# Patient Record
Sex: MALE | URBAN - METROPOLITAN AREA
[De-identification: names, ages, dates, MRNs, and addresses within clinical notes are randomized per-mention and may not be internally consistent; named-entity substitution may affect disease eponyms.]

---

## 2020-06-25 ENCOUNTER — TELEPHONE (OUTPATIENT)
Dept: FAMILY MEDICINE CLINIC | Age: 65
End: 2020-06-25

## 2020-06-26 ENCOUNTER — OFFICE VISIT (OUTPATIENT)
Dept: FAMILY MEDICINE CLINIC | Age: 65
End: 2020-06-26
Payer: COMMERCIAL

## 2020-06-26 VITALS
OXYGEN SATURATION: 98 % | DIASTOLIC BLOOD PRESSURE: 100 MMHG | RESPIRATION RATE: 12 BRPM | SYSTOLIC BLOOD PRESSURE: 152 MMHG | BODY MASS INDEX: 32.44 KG/M2 | WEIGHT: 190 LBS | TEMPERATURE: 98.3 F | HEART RATE: 78 BPM | HEIGHT: 64 IN

## 2020-06-26 PROCEDURE — 99203 OFFICE O/P NEW LOW 30 MIN: CPT | Performed by: FAMILY MEDICINE

## 2020-06-26 RX ORDER — ERGOCALCIFEROL 1.25 MG/1
1 CAPSULE ORAL WEEKLY
COMMUNITY

## 2020-06-26 RX ORDER — CLOTRIMAZOLE AND BETAMETHASONE DIPROPIONATE 10; .64 MG/G; MG/G
CREAM TOPICAL 2 TIMES DAILY
COMMUNITY
Start: 2020-04-06 | End: 2020-06-26 | Stop reason: SDUPTHER

## 2020-06-26 RX ORDER — CLOTRIMAZOLE AND BETAMETHASONE DIPROPIONATE 10; .64 MG/G; MG/G
CREAM TOPICAL 2 TIMES DAILY
Qty: 45 G | Refills: 1 | Status: SHIPPED | OUTPATIENT
Start: 2020-06-26 | End: 2020-10-30 | Stop reason: SDUPTHER

## 2020-06-26 RX ORDER — DOXYCYCLINE HYCLATE 100 MG
100 TABLET ORAL 2 TIMES DAILY
Qty: 20 TABLET | Refills: 1 | Status: SHIPPED | OUTPATIENT
Start: 2020-06-26 | End: 2020-10-30 | Stop reason: SDUPTHER

## 2020-06-26 ASSESSMENT — PATIENT HEALTH QUESTIONNAIRE - PHQ9
SUM OF ALL RESPONSES TO PHQ9 QUESTIONS 1 & 2: 0
SUM OF ALL RESPONSES TO PHQ QUESTIONS 1-9: 0
SUM OF ALL RESPONSES TO PHQ QUESTIONS 1-9: 0
1. LITTLE INTEREST OR PLEASURE IN DOING THINGS: 0
2. FEELING DOWN, DEPRESSED OR HOPELESS: 0

## 2020-06-26 NOTE — PROGRESS NOTES
abused: None     Forced sexual activity: None   Other Topics Concern    None   Social History Narrative    None       Social History     Substance and Sexual Activity   Drug Use Not on file       History reviewed. No pertinent family history. Vitals:    06/26/20 1006   BP: (!) 152/100   Site: Left Upper Arm   Position: Sitting   Cuff Size: Large Adult   Pulse: 78   Resp: 12   Temp: 98.3 °F (36.8 °C)   TempSrc: Temporal   SpO2: 98%   Weight: 190 lb (86.2 kg)   Height: 5' 4\" (1.626 m)        Review of Systems   Constitutional: Negative. Respiratory: Negative. Cardiovascular: Negative. Objective:   Physical Exam  Constitutional:       General: He is not in acute distress. Appearance: He is well-developed. He is not diaphoretic. HENT:      Head: Normocephalic and atraumatic. Eyes:      General: No scleral icterus. Neck:      Musculoskeletal: Normal range of motion and neck supple. Thyroid: No thyromegaly. Trachea: No tracheal deviation. Cardiovascular:      Rate and Rhythm: Normal rate and regular rhythm. Heart sounds: Normal heart sounds. No murmur. Pulmonary:      Effort: Pulmonary effort is normal.      Breath sounds: Normal breath sounds. Lymphadenopathy:      Head:      Right side of head: No submental or submandibular adenopathy. Left side of head: No submental or submandibular adenopathy. Cervical: No cervical adenopathy. Skin:     General: Skin is warm and dry. Comments: Slightly hyperpigmented papules in axilla   Neurological:      Mental Status: He is alert and oriented to person, place, and time. Cranial Nerves: No cranial nerve deficit. Psychiatric:         Behavior: Behavior normal.         Thought Content: Thought content normal.         Judgment: Judgment normal.         Assessment:       Diagnosis Orders   1.  Folliculitis            Plan:      Sounds like has had some tinea off an on but looks like right now predominantly folliculitis.   Covering with doxy  Medication side affects and adverse reactions reviewed, including photosensitivity      blood pressure improved on recheck  Robin Mas MD

## 2020-06-26 NOTE — PATIENT INSTRUCTIONS
A serving is 1 slice of bread, 1 ounce of dry cereal, or ½ cup of cooked rice, pasta, or cooked cereal. Try to choose whole-grain products as much as possible. · Limit lean meat, poultry, and fish to 2 servings each day. A serving is 3 ounces, about the size of a deck of cards. · Eat 4 to 5 servings of nuts, seeds, and legumes (cooked dried beans, lentils, and split peas) each week. A serving is 1/3 cup of nuts, 2 tablespoons of seeds, or ½ cup of cooked beans or peas. · Limit fats and oils to 2 to 3 servings each day. A serving is 1 teaspoon of vegetable oil or 2 tablespoons of salad dressing. · Limit sweets and added sugars to 5 servings or less a week. A serving is 1 tablespoon jelly or jam, ½ cup sorbet, or 1 cup of lemonade. · Eat less than 2,300 milligrams (mg) of sodium a day. If you limit your sodium to 1,500 mg a day, you can lower your blood pressure even more. Tips for success  · Start small. Do not try to make dramatic changes to your diet all at once. You might feel that you are missing out on your favorite foods and then be more likely to not follow the plan. Make small changes, and stick with them. Once those changes become habit, add a few more changes. · Try some of the following:  ? Make it a goal to eat a fruit or vegetable at every meal and at snacks. This will make it easy to get the recommended amount of fruits and vegetables each day. ? Try yogurt topped with fruit and nuts for a snack or healthy dessert. ? Add lettuce, tomato, cucumber, and onion to sandwiches. ? Combine a ready-made pizza crust with low-fat mozzarella cheese and lots of vegetable toppings. Try using tomatoes, squash, spinach, broccoli, carrots, cauliflower, and onions. ? Have a variety of cut-up vegetables with a low-fat dip as an appetizer instead of chips and dip. ? Sprinkle sunflower seeds or chopped almonds over salads. Or try adding chopped walnuts or almonds to cooked vegetables.   ? Try some vegetarian meals using beans and peas. Add garbanzo or kidney beans to salads. Make burritos and tacos with mashed chaudhari beans or black beans. Where can you learn more? Go to https://chcodyeb.Comfy. org and sign in to your [employer] account. Enter I849 in the eVoter box to learn more about \"DASH Diet: Care Instructions. \"     If you do not have an account, please click on the \"Sign Up Now\" link. Current as of: December 16, 2019               Content Version: 12.5  © 7679-1726 Healthwise, Incorporated. Care instructions adapted under license by [facility] ([facility]). If you have questions about a medical condition or this instruction, always ask your healthcare professional. [address] any warranty or liability for your use of this information.

## 2020-06-28 ASSESSMENT — ENCOUNTER SYMPTOMS: RESPIRATORY NEGATIVE: 1

## 2020-07-23 ENCOUNTER — TELEPHONE (OUTPATIENT)
Dept: FAMILY MEDICINE CLINIC | Age: 65
End: 2020-07-23

## 2020-10-30 RX ORDER — DOXYCYCLINE HYCLATE 100 MG
100 TABLET ORAL 2 TIMES DAILY
Qty: 20 TABLET | Refills: 1 | Status: SHIPPED | OUTPATIENT
Start: 2020-10-30 | End: 2020-12-04

## 2020-10-30 RX ORDER — CLOTRIMAZOLE AND BETAMETHASONE DIPROPIONATE 10; .64 MG/G; MG/G
CREAM TOPICAL 2 TIMES DAILY
Qty: 45 G | Refills: 1 | Status: SHIPPED | OUTPATIENT
Start: 2020-10-30

## 2020-12-04 ENCOUNTER — OFFICE VISIT (OUTPATIENT)
Dept: FAMILY MEDICINE CLINIC | Age: 65
End: 2020-12-04
Payer: COMMERCIAL

## 2020-12-04 VITALS
WEIGHT: 191 LBS | SYSTOLIC BLOOD PRESSURE: 123 MMHG | BODY MASS INDEX: 32.79 KG/M2 | DIASTOLIC BLOOD PRESSURE: 80 MMHG | HEART RATE: 95 BPM | OXYGEN SATURATION: 98 % | TEMPERATURE: 96.7 F

## 2020-12-04 PROCEDURE — 99213 OFFICE O/P EST LOW 20 MIN: CPT | Performed by: FAMILY MEDICINE

## 2020-12-04 RX ORDER — CLINDAMYCIN PHOSPHATE 11.9 MG/ML
SOLUTION TOPICAL
Qty: 30 ML | Refills: 2 | Status: SHIPPED | OUTPATIENT
Start: 2020-12-04 | End: 2021-01-03

## 2020-12-04 RX ORDER — MINOCYCLINE HYDROCHLORIDE 100 MG/1
100 CAPSULE ORAL 2 TIMES DAILY
Qty: 20 CAPSULE | Refills: 0 | Status: SHIPPED | OUTPATIENT
Start: 2020-12-04 | End: 2021-02-22 | Stop reason: ALTCHOICE

## 2021-02-22 ENCOUNTER — OFFICE VISIT (OUTPATIENT)
Dept: FAMILY MEDICINE CLINIC | Age: 66
End: 2021-02-22
Payer: COMMERCIAL

## 2021-02-22 ENCOUNTER — TELEPHONE (OUTPATIENT)
Dept: FAMILY MEDICINE CLINIC | Age: 66
End: 2021-02-22

## 2021-02-22 VITALS
BODY MASS INDEX: 32.95 KG/M2 | OXYGEN SATURATION: 98 % | SYSTOLIC BLOOD PRESSURE: 136 MMHG | HEIGHT: 64 IN | WEIGHT: 193 LBS | HEART RATE: 82 BPM | TEMPERATURE: 96.9 F | DIASTOLIC BLOOD PRESSURE: 80 MMHG

## 2021-02-22 DIAGNOSIS — L08.1 ERYTHRASMA: ICD-10-CM

## 2021-02-22 DIAGNOSIS — L24.9 IRRITANT CONTACT DERMATITIS, UNSPECIFIED TRIGGER: Primary | ICD-10-CM

## 2021-02-22 PROCEDURE — 99213 OFFICE O/P EST LOW 20 MIN: CPT | Performed by: FAMILY MEDICINE

## 2021-02-22 RX ORDER — TRIAMCINOLONE ACETONIDE 5 MG/G
CREAM TOPICAL
Qty: 15 G | Refills: 1 | Status: SHIPPED | OUTPATIENT
Start: 2021-02-22 | End: 2022-01-17 | Stop reason: SDUPTHER

## 2021-02-22 RX ORDER — ERYTHROMYCIN 20 MG/G
GEL TOPICAL
Qty: 30 G | Refills: 1 | Status: SHIPPED | OUTPATIENT
Start: 2021-02-22 | End: 2022-09-23 | Stop reason: SDUPTHER

## 2021-02-22 SDOH — ECONOMIC STABILITY: TRANSPORTATION INSECURITY
IN THE PAST 12 MONTHS, HAS THE LACK OF TRANSPORTATION KEPT YOU FROM MEDICAL APPOINTMENTS OR FROM GETTING MEDICATIONS?: NOT ASKED

## 2021-02-22 ASSESSMENT — ENCOUNTER SYMPTOMS
DIARRHEA: 0
CONSTIPATION: 0
SORE THROAT: 0
ABDOMINAL PAIN: 0
RHINORRHEA: 0
CHEST TIGHTNESS: 0
SHORTNESS OF BREATH: 0

## 2021-02-22 NOTE — TELEPHONE ENCOUNTER
----- Message from Rommel Rhiannon sent at 2/22/2021  9:32 AM EST -----  Subject: Appointment Request    Reason for Call: Urgent Skin Problem    QUESTIONS  Type of Appointment? Established Patient  Reason for appointment request? Other - Sorry [facility] can't book appt type. Additional Information for Provider? Pt has a rash on stomach. It started as 1 red dot then spread to more. No pain and he can't tell if it has gotten worse. He is open to vv and says mornings work best if possible.   ---------------------------------------------------------------------------  --------------  [address]  What is the best way for the office to contact you? OK to leave message on   voicemail  Preferred Call Back Phone Number? [phone]  ---------------------------------------------------------------------------  --------------  SCRIPT ANSWERS  Relationship to Patient? Self  Appointment reason? Symptomatic  Select script based on patient symptoms? Adult Skin Problems [Rash   Hives   Blisters   Lumps   Bumps   Sores   Bite]   Are you having swelling in your throat or face? No  Are you having difficulty breathing? No  Have the symptoms worsened or spread in the last day? No  Are you having pain with your symptoms? No  Are you having fevers (100.4)   chills or sweats? No  Have you previously seen a provider for this? No  Have you been diagnosed with   tested for   or told that you are suspected of having COVID-19 (Coronavirus)? No  Have you had a fever or taken medication to treat a fever within the past   3 days? No  Have you had a cough   shortness of breath or flu-like symptoms within the past 3 days? No  Do you currently have flu-like symptoms including fever or chills   cough   shortness of breath   or difficulty breathing   or new loss of taste or smell? No  (Service Expert - click yes below to proceed with [employer] As Usual   Scheduling)?  Yes

## 2021-02-22 NOTE — PROGRESS NOTES
Subjective:   Patient ID: Buster Morales is a 63 y.o. male. HPI by clinical support staff:   Chief Complaint   Patient presents with    Rash     right ankle and right lower abdomen, itching, no pain, x 1 week      Preliminary data above this line collected by clinical support staff.    ______________________________________________________________________  HPI by Provider:   HPI   Patient presents for urgent visit. Reports rash x 1 week. itches but not painful. Tried clotrimazole/bethamethasone with no relief. Denies chills, fever, sick contacts, arthralgia, recent upper respiratory infection and change sin skin care products or detergents. Reports different rash in armpits for months- tried clotrimazole/bethamethasone with no relief. Trying to keep area dry. Data above this line collected by Provider. Patient's medications, allergies, past medical, surgical, social and family histories were reviewed and updated as appropriate. Patient Care Team:  Tico Plata MD as PCP - General (Family Medicine)  Tico Plata MD as PCP - [facility] Empaneled Provider    Current Outpatient Medications on File Prior to Visit   Medication Sig Dispense Refill    clotrimazole-betamethasone (LOTRISONE) 1-0.05 % cream Apply topically 2 times daily 45 g 1    vitamin D (ERGOCALCIFEROL) 1.25 MG (40054 UT) CAPS capsule Take 1 tablet by mouth once a week       No current facility-administered medications on file prior to visit. Review of Systems   Constitutional: Negative for activity change, appetite change, fatigue and fever. HENT: Negative for congestion, rhinorrhea and sore throat. Respiratory: Negative for chest tightness and shortness of breath. Cardiovascular: Negative for chest pain, palpitations and leg swelling. Gastrointestinal: Negative for abdominal pain, constipation and diarrhea. Genitourinary: Negative for dysuria and frequency. Musculoskeletal: Negative for arthralgias.    Skin: Positive for rash. Neurological: Negative for dizziness, weakness and headaches. Psychiatric/Behavioral: Negative for hallucinations. All other systems reviewed and are negative. ROS above this line reviewed by Provider. Objective:   /80   Pulse 82   Temp 96.9 °F (36.1 °C)   Ht 5' 4\" (1.626 m)   Wt 193 lb (87.5 kg)   SpO2 98%   BMI 33.13 kg/m²   Physical Exam  Vitals signs and nursing note reviewed. Constitutional:       General: He is not in acute distress. Appearance: Normal appearance. He is normal weight. He is not ill-appearing, toxic-appearing or diaphoretic. HENT:      Head: Normocephalic and atraumatic. Eyes:      General: No scleral icterus. Conjunctiva/sclera: Conjunctivae normal.   Neck:      Musculoskeletal: Normal range of motion. Cardiovascular:      Rate and Rhythm: Normal rate and regular rhythm. Heart sounds: Normal heart sounds. No murmur. No friction rub. No gallop. Pulmonary:      Effort: Pulmonary effort is normal.   Abdominal:       Skin:     General: Skin is warm and dry. Findings: Rash (macular rash with dry scaly skin. no vessicles. non tender.) present. Neurological:      Mental Status: He is alert. Psychiatric:         Mood and Affect: Mood normal.         Behavior: Behavior normal.       Assessment and Plan:   1. Irritant contact dermatitis, unspecified trigger  Avoid triggers- does not appear to be shingles. - triamcinolone (ARISTOCORT) 0.5 % cream; Apply topically 2 times daily to abdomen. Dispense: 15 g; Refill: 1    2. Erythrasma  Tried and failed clotrimazole/bethamethasone with no relief. - erythromycin with ethanol (EMGEL) 2 % gel; Apply topically 2 times daily. Dispense: 30 g; Refill: 1           This chart note was prepared using a voice recognition dictation program. This note was reviewed for accuracy; however, addition, deletion and sound-alike word errors may occur.  If there are any questions regarding this chart note, please contact the originating provider. Electronically signed by   Aundrea Perrin MD  2/22/2021   2:05 PM    Return if symptoms worsen or fail to improve.

## 2021-02-22 NOTE — TELEPHONE ENCOUNTER
----- Message from Randee Agostois sent at 2/22/2021  9:14 AM EST -----  Subject: Appointment Request    Reason for Call: Urgent Skin Problem    QUESTIONS  Type of Appointment? Established Patient  Reason for appointment request? Other - [facility] cannot schedule  Additional Information for Provider? Patient has a rash on his stomach and wants to rule out shingles. Patient needs a call back to schedule appointment.  ---------------------------------------------------------------------------  --------------  [facility]  What is the best way for the office to contact you? OK to leave message on   voicemail  Preferred Call Back Phone Number? [phone]  ---------------------------------------------------------------------------  --------------  SCRIPT ANSWERS  Relationship to Patient? Self  Appointment reason? Symptomatic  Select script based on patient symptoms? Adult Skin Problems [Rash   Hives   Blisters   Lumps   Bumps   Sores   Bite]   Are you having swelling in your throat or face? No  Are you having difficulty breathing? No  Have the symptoms worsened or spread in the last day? No  Are you having pain with your symptoms? No  Are you having fevers (100.4)   chills or sweats? No  Have you previously seen a provider for this? No  Have you been diagnosed with   tested for   or told that you are suspected of having COVID-19 (Coronavirus)? No  Have you had a fever or taken medication to treat a fever within the past   3 days? No  Have you had a cough   shortness of breath or flu-like symptoms within the past 3 days? No  Do you currently have flu-like symptoms including fever or chills   cough   shortness of breath   or difficulty breathing   or new loss of taste or smell? No  (Service Expert - click yes below to proceed with [employer] As Usual   Scheduling)?  Yes

## 2022-01-17 ENCOUNTER — PATIENT MESSAGE (OUTPATIENT)
Dept: PRIMARY CARE CLINIC | Age: 67
End: 2022-01-17

## 2022-01-17 ENCOUNTER — PATIENT MESSAGE (OUTPATIENT)
Dept: FAMILY MEDICINE CLINIC | Age: 67
End: 2022-01-17

## 2022-01-17 DIAGNOSIS — L24.9 IRRITANT CONTACT DERMATITIS, UNSPECIFIED TRIGGER: ICD-10-CM

## 2022-01-17 RX ORDER — TRIAMCINOLONE ACETONIDE 5 MG/G
CREAM TOPICAL
Qty: 15 G | Refills: 1 | Status: CANCELLED | OUTPATIENT
Start: 2022-01-17 | End: 2022-01-24

## 2022-01-17 RX ORDER — TRIAMCINOLONE ACETONIDE 5 MG/G
CREAM TOPICAL
Qty: 15 G | Refills: 1 | Status: SHIPPED | OUTPATIENT
Start: 2022-01-17 | End: 2022-01-24

## 2022-01-17 NOTE — TELEPHONE ENCOUNTER
Medication:   Requested Prescriptions     Pending Prescriptions Disp Refills    triamcinolone (ARISTOCORT) 0.5 % cream 15 g 1     Sig: Apply topically 2 times daily to abdomen. Last Filled:  02/22/2021    Patient Phone Number: [phone] (home)     Last appt: 2/22/2021   Next appt: Visit date not found    Last OARRS: No flowsheet data found.

## 2022-01-17 NOTE — TELEPHONE ENCOUNTER
From: Tim Toney  To: Dr. Clemencia Wang  Sent: 1/17/2022 11:43 AM EST  Subject: Refill    Need refill for TRIAMCINOLONE ACETONIDE CREAM

## 2022-01-17 NOTE — TELEPHONE ENCOUNTER
From: Khadar Orona  To: Dr. Jo Ann Mcfadden: 1/17/2022 11:42 AM EST  Subject: Refill    Need refill for TRIAMCLINOLONE Cream

## 2022-08-22 ENCOUNTER — TELEPHONE (OUTPATIENT)
Dept: FAMILY MEDICINE CLINIC | Age: 67
End: 2022-08-22

## 2022-08-22 NOTE — TELEPHONE ENCOUNTER
Patient has not seen Dr. Klever Claudio since 2020, advised him he needs to schedule an appointment - he declines at this time, but will call back.

## 2022-08-22 NOTE — TELEPHONE ENCOUNTER
Erythromycin with ethanol (EMGEL) 2% gel. Pt uses this for itching under his armpits. Rx previously written by Dr. Luis Alfonso. Send to [facility] [employer] [address].

## 2022-08-30 DIAGNOSIS — L24.9 IRRITANT CONTACT DERMATITIS, UNSPECIFIED TRIGGER: ICD-10-CM

## 2022-08-31 RX ORDER — TRIAMCINOLONE ACETONIDE 5 MG/G
CREAM TOPICAL
Qty: 15 G | Refills: 1 | Status: SHIPPED | OUTPATIENT
Start: 2022-08-31

## 2022-09-23 DIAGNOSIS — L08.1 ERYTHRASMA: ICD-10-CM

## 2022-09-23 RX ORDER — ERYTHROMYCIN 20 MG/G
GEL TOPICAL
Qty: 30 G | Refills: 1 | Status: SHIPPED | OUTPATIENT
Start: 2022-09-23

## 2022-11-11 DIAGNOSIS — L24.9 IRRITANT CONTACT DERMATITIS, UNSPECIFIED TRIGGER: ICD-10-CM

## 2022-11-11 RX ORDER — TRIAMCINOLONE ACETONIDE 5 MG/G
CREAM TOPICAL
Qty: 15 G | Refills: 1 | Status: SHIPPED | OUTPATIENT
Start: 2022-11-11

## 2022-11-11 NOTE — TELEPHONE ENCOUNTER
Medication:   Requested Prescriptions     Pending Prescriptions Disp Refills    triamcinolone (ARISTOCORT) 0.5 % cream 15 g 1     Sig: APPLY TOPICALLY TWICE A DAY TO ABDOMEN        Last Filled: 8/31/2022   Last appt: 2/22/2021   Next appt: NONE

## 2022-12-30 ENCOUNTER — OFFICE VISIT (OUTPATIENT)
Dept: PRIMARY CARE CLINIC | Age: 67
End: 2022-12-30
Payer: COMMERCIAL

## 2022-12-30 VITALS
BODY MASS INDEX: 32.61 KG/M2 | HEART RATE: 80 BPM | SYSTOLIC BLOOD PRESSURE: 126 MMHG | RESPIRATION RATE: 16 BRPM | HEIGHT: 64 IN | OXYGEN SATURATION: 98 % | DIASTOLIC BLOOD PRESSURE: 80 MMHG | WEIGHT: 191 LBS

## 2022-12-30 DIAGNOSIS — B37.89 CANDIDA RASH OF GROIN: Primary | ICD-10-CM

## 2022-12-30 PROCEDURE — 1123F ACP DISCUSS/DSCN MKR DOCD: CPT | Performed by: FAMILY MEDICINE

## 2022-12-30 PROCEDURE — 99213 OFFICE O/P EST LOW 20 MIN: CPT | Performed by: FAMILY MEDICINE

## 2022-12-30 RX ORDER — CLOTRIMAZOLE AND BETAMETHASONE DIPROPIONATE 10; .64 MG/G; MG/G
CREAM TOPICAL 2 TIMES DAILY
Qty: 45 G | Refills: 1 | Status: SHIPPED | OUTPATIENT
Start: 2022-12-30

## 2022-12-30 SDOH — ECONOMIC STABILITY: FOOD INSECURITY: WITHIN THE PAST 12 MONTHS, YOU WORRIED THAT YOUR FOOD WOULD RUN OUT BEFORE YOU GOT MONEY TO BUY MORE.: NEVER TRUE

## 2022-12-30 SDOH — ECONOMIC STABILITY: FOOD INSECURITY: WITHIN THE PAST 12 MONTHS, THE FOOD YOU BOUGHT JUST DIDN'T LAST AND YOU DIDN'T HAVE MONEY TO GET MORE.: NEVER TRUE

## 2022-12-30 ASSESSMENT — PATIENT HEALTH QUESTIONNAIRE - PHQ9
SUM OF ALL RESPONSES TO PHQ9 QUESTIONS 1 & 2: 0
SUM OF ALL RESPONSES TO PHQ QUESTIONS 1-9: 0
8. MOVING OR SPEAKING SO SLOWLY THAT OTHER PEOPLE COULD HAVE NOTICED. OR THE OPPOSITE, BEING SO FIGETY OR RESTLESS THAT YOU HAVE BEEN MOVING AROUND A LOT MORE THAN USUAL: 0
SUM OF ALL RESPONSES TO PHQ QUESTIONS 1-9: 0
2. FEELING DOWN, DEPRESSED OR HOPELESS: 0
6. FEELING BAD ABOUT YOURSELF - OR THAT YOU ARE A FAILURE OR HAVE LET YOURSELF OR YOUR FAMILY DOWN: 0
10. IF YOU CHECKED OFF ANY PROBLEMS, HOW DIFFICULT HAVE THESE PROBLEMS MADE IT FOR YOU TO DO YOUR WORK, TAKE CARE OF THINGS AT HOME, OR GET ALONG WITH OTHER PEOPLE: 0
9. THOUGHTS THAT YOU WOULD BE BETTER OFF DEAD, OR OF HURTING YOURSELF: 0
4. FEELING TIRED OR HAVING LITTLE ENERGY: 0
1. LITTLE INTEREST OR PLEASURE IN DOING THINGS: 0
7. TROUBLE CONCENTRATING ON THINGS, SUCH AS READING THE NEWSPAPER OR WATCHING TELEVISION: 0
3. TROUBLE FALLING OR STAYING ASLEEP: 0
SUM OF ALL RESPONSES TO PHQ QUESTIONS 1-9: 0
5. POOR APPETITE OR OVEREATING: 0
SUM OF ALL RESPONSES TO PHQ QUESTIONS 1-9: 0

## 2022-12-30 ASSESSMENT — ENCOUNTER SYMPTOMS
APNEA: 0
ALLERGIC/IMMUNOLOGIC NEGATIVE: 1
EYES NEGATIVE: 1
GASTROINTESTINAL NEGATIVE: 1
RESPIRATORY NEGATIVE: 1

## 2022-12-30 ASSESSMENT — SOCIAL DETERMINANTS OF HEALTH (SDOH): HOW HARD IS IT FOR YOU TO PAY FOR THE VERY BASICS LIKE FOOD, HOUSING, MEDICAL CARE, AND HEATING?: NOT HARD AT ALL

## 2022-12-30 NOTE — PROGRESS NOTES
SUBJECTIVE:  Patient ID: Pedro Jesus is a 74 y.o. male. Chief Complaint:  Chief Complaint   Patient presents with    Establish Care    Rash     Right thigh, will go away a little bit but come back. HPI  74year old  Rash x 1 month   Itch when get dry   Rash groin area   Use Rx prescribed by Dr Anthony Nice work Remote sit all day   Pt denies any chronic disease or any medical problem  I did correct his problem list     Past Medical History:   Diagnosis Date    BMI 32.0-32.9,adult      Past Surgical History:   Procedure Laterality Date    ARM SURGERY Left     Duepaulina Mcclurem while playing [employer]  year [birth year]4     Allergies   Allergen Reactions    Other Swelling     Ppd-hair dye causes rash and swelling all over body     Penicillins Rash       Family History   Problem Relation Age of Onset    Other Mother         Dengue fever    Kidney Disease Mother         +Dialysis    Other Father         Dengue    Other Brother         Pneumonia     Social History     Social History Narrative    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 30 g 1     No current facility-administered medications for this visit.      No results found for: WBC, HGB, HCT, MCV, PLT  No results found for: CHOL  No results found for: TRIG  No results found for: HDL  No results found for: LDLCHOLESTEROL, LDLCALC  No results found for: LABVLDL, VLDL  No results found for: CHOLHDLRATIO    Chemistry    No results found for: NA, K, CL, CO2, BUN, CREATININE, GLU No results found for: CALCIUM, ALKPHOS, AST, ALT, BILITOT         Review of Systems   Constitutional: Negative. HENT: Negative. Eyes: Negative. Respiratory: Negative. Negative for apnea. Cardiovascular: Negative. Gastrointestinal: Negative. Endocrine: Negative. Genitourinary: Negative. Musculoskeletal: Negative. Skin:         Groin itch x over a month   Allergic/Immunologic: Negative. Neurological: Negative. Hematological: Negative. Psychiatric/Behavioral: Negative. OBJECTIVE:  /80   Pulse 80   Resp 16   Ht 5' 4\" (1.626 m)   Wt 191 lb (86.6 kg)   SpO2 98%   BMI 32.79 kg/m²   Physical Exam  Constitutional:       Comments: BMI 32   HENT:      Head: Normocephalic and atraumatic. Eyes:      Extraocular Movements: Extraocular movements intact. Pupils: Pupils are equal, round, and reactive to light. Skin:     Comments: Groin + erythema + scaly area,bilateral   Neurological:      Mental Status: He is alert. ASSESSMENT/PLAN:      Diagnosis Orders   1. Candida rash of groin  clotrimazole-betamethasone (LOTRISONE) 1-0.05 % cream      2.  BMI 32.0-32.9,adult              Pt denies any medical problem or chronic disease  I did correct his History & medical problem list  PMH,PSH,Allergy,FH,SocialHx documentation done  Pt get lab through [address] through wife work last lab 8/2022  Get copy of  recent lablab   No record for lab or colonoscopy  Last lab @ date 6/29/2006  Previous [address]  visit 4/4/2020  [facility] office visit 2/22/2021  & 12/4/2020 & 6/26/2020   Covid vaccine + booster done  Flu shot done  Shingle done x 1 [employer]  Pneumonia  [employer]  Schedule visit annual

## 2023-01-03 ENCOUNTER — TELEPHONE (OUTPATIENT)
Dept: FAMILY MEDICINE CLINIC | Age: 68
End: 2023-01-03

## 2023-01-03 NOTE — TELEPHONE ENCOUNTER
----- Message from Sharri Barkley sent at 12/29/2022  9:57 AM EST -----  Subject: Appointment Request    Reason for Call: Established Patient Appointment needed: Semi-Routine Skin   Problem    QUESTIONS    Reason for appointment request? No appointments available during search     Additional Information for Provider? Pt has rash on right leg . Has been on   leg for about month now . Pt would like to be seen as soon as possible   please reach out to pt in this regards ,Pt also have question about   insurance as well   ---------------------------------------------------------------------------  --------------  [address] [employer]  [phone]; OK to leave message on voicemail  ---------------------------------------------------------------------------  --------------  SCRIPT ANSWERS  AYLINID Screen: Crista Chawla

## 2023-01-03 NOTE — TELEPHONE ENCOUNTER
Check record first instead send same message  Unless Pt has new request  Pt was seen 12/30/2022  Call pt I did request copy of his latest lab +schedule CPE  Thanks

## 2023-01-23 ENCOUNTER — TELEPHONE (OUTPATIENT)
Dept: PRIMARY CARE CLINIC | Age: 68
End: 2023-01-23

## 2023-01-23 DIAGNOSIS — B35.4 TINEA CORPORIS: Primary | ICD-10-CM

## 2023-01-23 NOTE — TELEPHONE ENCOUNTER
It was new patient visit  According to pt rash is off& on for few years  Refer to Dermatology Referral Epic  Pt was advised Need Physical & Lab up date   No Lab in his record  Need get lab value

## 2023-01-23 NOTE — TELEPHONE ENCOUNTER
Pt called and stated his rash has not gotten any better. Pt  states the meds are not helping. Pls call pt to discuss. I offered a appt but pt wanted me to put in a telephone note first. Can pt get a different kind of medication for the rash?

## 2023-01-23 NOTE — TELEPHONE ENCOUNTER
Called  the  patient  does not want a referral to dermatologist  told him  he needed physical  and labs

## 2023-09-19 ENCOUNTER — HOSPITAL ENCOUNTER (OUTPATIENT)
Dept: GENERAL RADIOLOGY | Age: 68
Discharge: HOME | End: 2023-09-19
Payer: COMMERCIAL

## 2023-09-19 ENCOUNTER — OFFICE VISIT (OUTPATIENT)
Dept: PRIMARY CARE CLINIC | Age: 68
End: 2023-09-19
Payer: COMMERCIAL

## 2023-09-19 VITALS
SYSTOLIC BLOOD PRESSURE: 142 MMHG | HEIGHT: 64 IN | TEMPERATURE: 97.6 F | WEIGHT: 192.6 LBS | BODY MASS INDEX: 32.88 KG/M2 | DIASTOLIC BLOOD PRESSURE: 88 MMHG | OXYGEN SATURATION: 97 % | HEART RATE: 63 BPM

## 2023-09-19 DIAGNOSIS — R05.1 ACUTE COUGH: Primary | ICD-10-CM

## 2023-09-19 DIAGNOSIS — R05.1 ACUTE COUGH: ICD-10-CM

## 2023-09-19 DIAGNOSIS — J02.9 SORE THROAT: ICD-10-CM

## 2023-09-19 DIAGNOSIS — K21.9 GASTROESOPHAGEAL REFLUX DISEASE, UNSPECIFIED WHETHER ESOPHAGITIS PRESENT: ICD-10-CM

## 2023-09-19 LAB — S PYO AG THROAT QL: NORMAL

## 2023-09-19 PROCEDURE — 87880 STREP A ASSAY W/OPTIC: CPT | Performed by: FAMILY MEDICINE

## 2023-09-19 PROCEDURE — 71046 X-RAY EXAM CHEST 2 VIEWS: CPT

## 2023-09-19 PROCEDURE — 99214 OFFICE O/P EST MOD 30 MIN: CPT | Performed by: FAMILY MEDICINE

## 2023-09-19 PROCEDURE — 1123F ACP DISCUSS/DSCN MKR DOCD: CPT | Performed by: FAMILY MEDICINE

## 2023-09-19 RX ORDER — OMEPRAZOLE 40 MG/1
40 CAPSULE, DELAYED RELEASE ORAL
Qty: 30 CAPSULE | Refills: 2 | Status: SHIPPED | OUTPATIENT
Start: 2023-09-19

## 2023-09-19 SDOH — ECONOMIC STABILITY: INCOME INSECURITY: HOW HARD IS IT FOR YOU TO PAY FOR THE VERY BASICS LIKE FOOD, HOUSING, MEDICAL CARE, AND HEATING?: NOT HARD AT ALL

## 2023-09-19 SDOH — ECONOMIC STABILITY: HOUSING INSECURITY
IN THE LAST 12 MONTHS, WAS THERE A TIME WHEN YOU DID NOT HAVE A STEADY PLACE TO SLEEP OR SLEPT IN A SHELTER (INCLUDING NOW)?: NO

## 2023-09-19 SDOH — ECONOMIC STABILITY: FOOD INSECURITY: WITHIN THE PAST 12 MONTHS, YOU WORRIED THAT YOUR FOOD WOULD RUN OUT BEFORE YOU GOT MONEY TO BUY MORE.: NEVER TRUE

## 2023-09-19 SDOH — ECONOMIC STABILITY: FOOD INSECURITY: WITHIN THE PAST 12 MONTHS, THE FOOD YOU BOUGHT JUST DIDN'T LAST AND YOU DIDN'T HAVE MONEY TO GET MORE.: NEVER TRUE

## 2023-09-19 SDOH — ECONOMIC STABILITY: TRANSPORTATION INSECURITY
IN THE PAST 12 MONTHS, HAS LACK OF TRANSPORTATION KEPT YOU FROM MEETINGS, WORK, OR FROM GETTING THINGS NEEDED FOR DAILY LIVING?: NO

## 2023-09-19 ASSESSMENT — ENCOUNTER SYMPTOMS
COUGH: 1
GASTROINTESTINAL NEGATIVE: 1
SHORTNESS OF BREATH: 0
EYES NEGATIVE: 1
APNEA: 0
SORE THROAT: 1
STRIDOR: 0
CHEST TIGHTNESS: 0
WHEEZING: 0

## 2023-09-19 ASSESSMENT — PATIENT HEALTH QUESTIONNAIRE - PHQ9
SUM OF ALL RESPONSES TO PHQ9 QUESTIONS 1 & 2: 0
SUM OF ALL RESPONSES TO PHQ QUESTIONS 1-9: 0
1. LITTLE INTEREST OR PLEASURE IN DOING THINGS: 0
1. LITTLE INTEREST OR PLEASURE IN DOING THINGS: NOT AT ALL
2. FEELING DOWN, DEPRESSED OR HOPELESS: NOT AT ALL
SUM OF ALL RESPONSES TO PHQ9 QUESTIONS 1 & 2: 0
2. FEELING DOWN, DEPRESSED OR HOPELESS: 0

## 2023-09-27 RX ORDER — CLOTRIMAZOLE AND BETAMETHASONE DIPROPIONATE 10; .64 MG/G; MG/G
CREAM TOPICAL
Qty: 45 G | Refills: 1 | Status: SHIPPED | OUTPATIENT
Start: 2023-09-27 | End: 2023-11-14

## 2023-11-02 ENCOUNTER — OFFICE VISIT (OUTPATIENT)
Dept: PRIMARY CARE CLINIC | Age: 68
End: 2023-11-02
Payer: COMMERCIAL

## 2023-11-02 ENCOUNTER — PATIENT MESSAGE (OUTPATIENT)
Dept: PRIMARY CARE CLINIC | Age: 68
End: 2023-11-02

## 2023-11-02 VITALS
OXYGEN SATURATION: 99 % | TEMPERATURE: 97 F | HEIGHT: 64 IN | BODY MASS INDEX: 33.22 KG/M2 | HEART RATE: 84 BPM | SYSTOLIC BLOOD PRESSURE: 132 MMHG | DIASTOLIC BLOOD PRESSURE: 70 MMHG | WEIGHT: 194.6 LBS

## 2023-11-02 DIAGNOSIS — L02.91 ABSCESS: Primary | ICD-10-CM

## 2023-11-02 PROCEDURE — 99214 OFFICE O/P EST MOD 30 MIN: CPT | Performed by: FAMILY MEDICINE

## 2023-11-02 PROCEDURE — 1123F ACP DISCUSS/DSCN MKR DOCD: CPT | Performed by: FAMILY MEDICINE

## 2023-11-02 RX ORDER — SULFAMETHOXAZOLE AND TRIMETHOPRIM 800; 160 MG/1; MG/1
1 TABLET ORAL 2 TIMES DAILY
Qty: 20 TABLET | Refills: 0 | Status: SHIPPED | OUTPATIENT
Start: 2023-11-02 | End: 2023-11-12

## 2023-11-02 RX ORDER — ERYTHROMYCIN 20 MG/G
GEL TOPICAL DAILY
COMMUNITY

## 2023-11-02 ASSESSMENT — ENCOUNTER SYMPTOMS
GASTROINTESTINAL NEGATIVE: 1
EYES NEGATIVE: 1
RESPIRATORY NEGATIVE: 1
APNEA: 0

## 2023-11-02 NOTE — PROGRESS NOTES
SUBJECTIVE:  Patient ID: Wayne Lipscomb is a 79 y.o. y.o. male     HPI   Patient presented with a concern about spot in his lower abdomen he thinks he was bit by something else been painful irritated started draining yesterday or before is not sure  Denies any fever or chills just a little tender around that area he is not sure how long he had it  Past Medical History:   Diagnosis Date    BMI 32.0-32.9,adult       Past Surgical History:   Procedure Laterality Date    ARM SURGERY Left     Tavia Bigness while playing [employer]  year [birth year]01     Family History   Problem Relation Age of Onset    Other Mother         Dengue fever    Kidney Disease Mother         +Dialysis    Other Father         Dengue    Other Brother         Pneumonia     Social History     Socioeconomic History    Marital status:      Spouse name: None    Number of children: 2    Years of education: None    Highest education level: None   Tobacco Use    Smoking status: Never    Smokeless tobacco: Never   Vaping Use    Vaping Use: Never used   Substance and Sexual Activity    Alcohol use: Not Currently    Sexual activity: Yes     Partners: Female   Social History Narrative    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    Son 27 @ [employer] [place] [employer]\""     Son  25  resident in [place]    No Tobacco    Social alcohol     Social Determinants of Health     Financial Resource Strain: [address]  (9/19/2023)    Overall Financial Resource Strain (CARDIA)     Difficulty of Paying Living Expenses: Not hard at all   Food Insecurity: No Food Insecurity (9/19/2023)    Hunger Vital Sign     Worried About Running Out of Food in the Last Year: Never true     [address] in the Last Year: Never true   Transportation Needs: Unknown (9/19/2023)    PRAPARE - Transportation     Lack of Transportation (Non-Medical): No   Housing Stability: Unknown (9/19/2023)    Housing Stability Vital Sign     Unstable Housing in the Last Year: No     Current

## 2023-11-03 NOTE — TELEPHONE ENCOUNTER
From: CHARLES Brown  To: Reyes Clark  Sent: 11/2/2023 2:31 PM EDT  Subject: General Surgeon    Rob Obrien,     Please see below information for the general surgeon      Appointment is scheduled for 1/6/23  Dr. Viktoriya Cooper., [address] [facility], [address] [place] [address]  Ph: [phone]    Let us know if you have any questions.

## 2023-11-06 ENCOUNTER — PATIENT MESSAGE (OUTPATIENT)
Dept: PRIMARY CARE CLINIC | Age: 68
End: 2023-11-06

## 2023-11-06 PROBLEM — H35.379 EPIRETINAL MEMBRANE: Status: ACTIVE | Noted: 2022-06-24

## 2023-11-06 RX ORDER — VANCOMYCIN HYDROCHLORIDE 125 MG/1
125 CAPSULE ORAL 4 TIMES DAILY
Qty: 40 CAPSULE | Refills: 0 | Status: SHIPPED | OUTPATIENT
Start: 2023-11-06 | End: 2023-11-14

## 2023-11-06 NOTE — TELEPHONE ENCOUNTER
Patient calling in about his culture analysis  Please follow up with patient regarding a treatment  Ph. [phone]  Patient would like to know about a medication for treatment

## 2023-11-14 ENCOUNTER — TELEPHONE (OUTPATIENT)
Dept: PRIMARY CARE CLINIC | Age: 68
End: 2023-11-14

## 2023-11-14 ENCOUNTER — OFFICE VISIT (OUTPATIENT)
Dept: PRIMARY CARE CLINIC | Age: 68
End: 2023-11-14
Payer: COMMERCIAL

## 2023-11-14 VITALS
RESPIRATION RATE: 12 BRPM | DIASTOLIC BLOOD PRESSURE: 80 MMHG | OXYGEN SATURATION: 99 % | SYSTOLIC BLOOD PRESSURE: 120 MMHG | WEIGHT: 194 LBS | BODY MASS INDEX: 33.3 KG/M2 | TEMPERATURE: 97.6 F | HEART RATE: 90 BPM

## 2023-11-14 DIAGNOSIS — T78.40XS ALLERGIC REACTION, SEQUELA: Primary | ICD-10-CM

## 2023-11-14 PROCEDURE — 1123F ACP DISCUSS/DSCN MKR DOCD: CPT | Performed by: FAMILY MEDICINE

## 2023-11-14 PROCEDURE — 99213 OFFICE O/P EST LOW 20 MIN: CPT | Performed by: FAMILY MEDICINE

## 2023-11-14 RX ORDER — METHYLPREDNISOLONE 4 MG/1
TABLET ORAL
Qty: 1 KIT | Refills: 0 | Status: SHIPPED | OUTPATIENT
Start: 2023-11-14

## 2023-11-14 ASSESSMENT — ENCOUNTER SYMPTOMS
EYES NEGATIVE: 1
RESPIRATORY NEGATIVE: 1
GASTROINTESTINAL NEGATIVE: 1

## 2023-11-14 NOTE — PROGRESS NOTES
SUBJECTIVE:  Patient ID: Kal Rush is a 79 y.o. y.o. male     HPI   Rash: Patient complains of rash involving the  upper body . Rash started 2 days ago. Appearance of rash at onset: Color of lesion(s): pink. Rash has changed over time Initial distribution: bilateral abdomen , bilateral back, and bilateral lower leg. Discomfort associated with rash: is pruritic. Associated symptoms: none. Denies: fever, congestion, headache, abdominal pain, nausea, vomiting, diarrhea, myalgia. Patient has not had previous evaluation of rash. Patient has not had previous treatment. Response to treatment: NA. Patient has not had contacts with similar rash. Patient has identified precipitant.  Patient has been on antibiotic which we think is the trigger)    Past Medical History:   Diagnosis Date    BMI 32.0-32.9,adult       Past Surgical History:   Procedure Laterality Date    ARM SURGERY Left     Leartis Halter while playing [employer] [place] plate  year 0902     Family History   Problem Relation Age of Onset    Other Mother         Dengue fever    Kidney Disease Mother         +Dialysis    Other Father         Dengue    Other Brother         Pneumonia     Social History     Socioeconomic History    Marital status:      Spouse name: None    Number of children: 2    Years of education: None    Highest education level: None   Tobacco Use    Smoking status: Never    Smokeless tobacco: Never   Vaping Use    Vaping Use: Never used   Substance and Sexual Activity    Alcohol use: Not Currently    Sexual activity: Yes     Partners: Female   Social History Narrative    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

## 2023-11-14 NOTE — TELEPHONE ENCOUNTER
Patient called and stated that he had visit with Dr Author Wellington  on 11/02. He was been referred to general surgery by Dr Author Wellington. He complained about rashes all over the body and chilled feeling so his referral Doctor said the rashes and chilling is dut to side effect of sulfamethoxazole-trimethoprim (BACTRIM DS;SEPTRA DS) 800-160 MG per tablet     Which was prescribed by Dr Author Wellington. So the patient requested some medications to get rid of the situations.     Please review and advice urgently    Thanks

## 2023-11-27 SDOH — HEALTH STABILITY: PHYSICAL HEALTH: ON AVERAGE, HOW MANY DAYS PER WEEK DO YOU ENGAGE IN MODERATE TO STRENUOUS EXERCISE (LIKE A BRISK WALK)?: 4 DAYS

## 2023-11-27 SDOH — HEALTH STABILITY: PHYSICAL HEALTH: ON AVERAGE, HOW MANY MINUTES DO YOU ENGAGE IN EXERCISE AT THIS LEVEL?: 20 MIN

## 2023-11-30 ENCOUNTER — OFFICE VISIT (OUTPATIENT)
Dept: PRIMARY CARE CLINIC | Age: 68
End: 2023-11-30
Payer: COMMERCIAL

## 2023-11-30 VITALS
SYSTOLIC BLOOD PRESSURE: 130 MMHG | OXYGEN SATURATION: 97 % | HEIGHT: 65 IN | WEIGHT: 192 LBS | DIASTOLIC BLOOD PRESSURE: 83 MMHG | HEART RATE: 92 BPM | TEMPERATURE: 98.3 F | BODY MASS INDEX: 31.99 KG/M2 | RESPIRATION RATE: 16 BRPM

## 2023-11-30 DIAGNOSIS — Z12.11 COLON CANCER SCREENING: ICD-10-CM

## 2023-11-30 DIAGNOSIS — B95.62 INFECTION OF SKIN DUE TO METHICILLIN RESISTANT STAPHYLOCOCCUS AUREUS (MRSA): ICD-10-CM

## 2023-11-30 DIAGNOSIS — Z00.00 PREVENTATIVE HEALTH CARE: Primary | ICD-10-CM

## 2023-11-30 DIAGNOSIS — L02.91 ABSCESS: ICD-10-CM

## 2023-11-30 DIAGNOSIS — L08.9 INFECTION OF SKIN DUE TO METHICILLIN RESISTANT STAPHYLOCOCCUS AUREUS (MRSA): ICD-10-CM

## 2023-11-30 DIAGNOSIS — Z00.00 PREVENTATIVE HEALTH CARE: ICD-10-CM

## 2023-11-30 PROBLEM — H35.379 EPIRETINAL MEMBRANE: Status: RESOLVED | Noted: 2022-06-24 | Resolved: 2023-11-30

## 2023-11-30 LAB
ALBUMIN SERPL-MCNC: 4.2 G/DL (ref 3.4–5)
ALBUMIN/GLOB SERPL: 2.3 {RATIO} (ref 1.1–2.2)
ALP SERPL-CCNC: 72 U/L (ref 40–129)
ALT SERPL-CCNC: 27 U/L (ref 10–40)
ANION GAP SERPL CALCULATED.3IONS-SCNC: 8 MMOL/L (ref 3–16)
AST SERPL-CCNC: 20 U/L (ref 15–37)
BASOPHILS # BLD: 0.3 K/UL (ref 0–0.2)
BASOPHILS NFR BLD: 5.4 %
BILIRUB SERPL-MCNC: 0.3 MG/DL (ref 0–1)
BUN SERPL-MCNC: 11 MG/DL (ref 7–20)
CALCIUM SERPL-MCNC: 9.4 MG/DL (ref 8.3–10.6)
CHLORIDE SERPL-SCNC: 104 MMOL/L (ref 99–110)
CHOLEST SERPL-MCNC: 193 MG/DL (ref 0–199)
CO2 SERPL-SCNC: 29 MMOL/L (ref 21–32)
CREAT SERPL-MCNC: 1 MG/DL (ref 0.8–1.3)
DEPRECATED RDW RBC AUTO: 14.4 % (ref 12.4–15.4)
EOSINOPHIL # BLD: 0.1 K/UL (ref 0–0.6)
EOSINOPHIL NFR BLD: 2.2 %
GFR SERPLBLD CREATININE-BSD FMLA CKD-EPI: >60 ML/MIN/{1.73_M2}
GLUCOSE SERPL-MCNC: 97 MG/DL (ref 70–99)
HCT VFR BLD AUTO: 40.9 % (ref 40.5–52.5)
HDLC SERPL-MCNC: 45 MG/DL (ref 40–60)
HGB BLD-MCNC: 13.3 G/DL (ref 13.5–17.5)
LDLC SERPL CALC-MCNC: 130 MG/DL
LYMPHOCYTES # BLD: 1.2 K/UL (ref 1–5.1)
LYMPHOCYTES NFR BLD: 21.8 %
MCH RBC QN AUTO: 28.9 PG (ref 26–34)
MCHC RBC AUTO-ENTMCNC: 32.6 G/DL (ref 31–36)
MCV RBC AUTO: 88.5 FL (ref 80–100)
MONOCYTES # BLD: 0.8 K/UL (ref 0–1.3)
MONOCYTES NFR BLD: 14.7 %
NEUTROPHILS # BLD: 3.1 K/UL (ref 1.7–7.7)
NEUTROPHILS NFR BLD: 55.9 %
PLATELET # BLD AUTO: 264 K/UL (ref 135–450)
PMV BLD AUTO: 8.6 FL (ref 5–10.5)
POTASSIUM SERPL-SCNC: 4.9 MMOL/L (ref 3.5–5.1)
PROT SERPL-MCNC: 6 G/DL (ref 6.4–8.2)
RBC # BLD AUTO: 4.62 M/UL (ref 4.2–5.9)
SODIUM SERPL-SCNC: 141 MMOL/L (ref 136–145)
T4 FREE SERPL-MCNC: 1.2 NG/DL (ref 0.9–1.8)
TRIGL SERPL-MCNC: 88 MG/DL (ref 0–150)
TSH SERPL DL<=0.005 MIU/L-ACNC: 4.66 UIU/ML (ref 0.27–4.2)
VLDLC SERPL CALC-MCNC: 18 MG/DL
WBC # BLD AUTO: 5.6 K/UL (ref 4–11)

## 2023-11-30 PROCEDURE — 99397 PER PM REEVAL EST PAT 65+ YR: CPT | Performed by: FAMILY MEDICINE

## 2023-11-30 RX ORDER — ERYTHROMYCIN 20 MG/G
GEL TOPICAL
Qty: 30 G | Refills: 1 | Status: SHIPPED | OUTPATIENT
Start: 2023-11-30

## 2023-11-30 RX ORDER — CHLORHEXIDINE GLUCONATE 213 G/1000ML
SOLUTION TOPICAL
Qty: 500 ML | Refills: 1 | Status: SHIPPED | OUTPATIENT
Start: 2023-11-30 | End: 2023-12-14

## 2023-11-30 RX ORDER — DOXYCYCLINE HYCLATE 100 MG
100 TABLET ORAL 2 TIMES DAILY
Qty: 14 TABLET | Refills: 0 | Status: SHIPPED | OUTPATIENT
Start: 2023-11-30 | End: 2023-12-07

## 2023-11-30 RX ORDER — TRIAMCINOLONE ACETONIDE 5 MG/G
CREAM TOPICAL
Qty: 80 G | Refills: 1 | Status: SHIPPED | OUTPATIENT
Start: 2023-11-30 | End: 2023-12-07

## 2023-11-30 ASSESSMENT — ENCOUNTER SYMPTOMS
DIARRHEA: 0
ABDOMINAL PAIN: 0
CONSTIPATION: 0
SHORTNESS OF BREATH: 0
CHEST TIGHTNESS: 0
RHINORRHEA: 0
SORE THROAT: 0

## 2023-11-30 NOTE — TELEPHONE ENCOUNTER
Medication:   Requested Prescriptions     Pending Prescriptions Disp Refills    erythromycin with ethanol (EMGEL) 2 % gel [Pharmacy Med Name: ERYTHROMYCIN 2% GEL] 30 g      Sig: APPLY  TOPICALLY TWO TIMES A DAY        Last Filled:  11/02/2023    Patient Phone Number: [phone] (home)     Last appt: Visit date not found   Next appt: Visit date not found    Last OARRS:        No data to display

## 2023-12-01 LAB
EST. AVERAGE GLUCOSE BLD GHB EST-MCNC: 122.6 MG/DL
HBA1C MFR BLD: 5.9 %

## 2023-12-17 LAB — NONINV COLON CA DNA+OCC BLD SCRN STL QL: NEGATIVE

## 2024-01-05 RX ORDER — OMEPRAZOLE 40 MG/1
40 CAPSULE, DELAYED RELEASE ORAL
Qty: 30 CAPSULE | Refills: 2 | OUTPATIENT
Start: 2024-01-05

## 2024-01-11 ENCOUNTER — TELEPHONE (OUTPATIENT)
Dept: PRIMARY CARE CLINIC | Age: 69
End: 2024-01-11

## 2024-01-11 RX ORDER — OMEPRAZOLE 40 MG/1
40 CAPSULE, DELAYED RELEASE ORAL
Qty: 30 CAPSULE | Refills: 2 | Status: SHIPPED | OUTPATIENT
Start: 2024-01-11

## 2024-01-11 NOTE — TELEPHONE ENCOUNTER
Medication and Quantity requested:       omeprazole (PRILOSEC) 40 MG delayed release capsule        Last Visit  11/30/23      Pharmacy and phone number updated in EPIC:  yes      Bebo

## 2024-04-09 RX ORDER — OMEPRAZOLE 40 MG/1
40 CAPSULE, DELAYED RELEASE ORAL
Qty: 30 CAPSULE | Refills: 2 | Status: SHIPPED | OUTPATIENT
Start: 2024-04-09

## 2024-04-09 NOTE — TELEPHONE ENCOUNTER
Medication:   Requested Prescriptions     Pending Prescriptions Disp Refills    omeprazole (PRILOSEC) 40 MG delayed release capsule [Pharmacy Med Name: OMEPRAZOLE DR 40 MG CAPSULE] 30 capsule 2     Sig: TAKE ONE CAPSULE BY MOUTH EVERY MORNING BEFORE BREAKFAST        Last Filled:  1/11/24 #30 2 refills    Patient Phone Number: [phone] (home)     Last appt: 11/30/2023   Next appt: Visit date not found

## 2024-07-17 RX ORDER — OMEPRAZOLE 40 MG/1
40 CAPSULE, DELAYED RELEASE ORAL
Qty: 30 CAPSULE | Refills: 2 | Status: SHIPPED | OUTPATIENT
Start: 2024-07-17

## 2024-07-17 NOTE — TELEPHONE ENCOUNTER
Medication:   Requested Prescriptions     Pending Prescriptions Disp Refills    omeprazole (PRILOSEC) 40 MG delayed release capsule 30 capsule 2     Sig: Take 1 capsule by mouth every morning (before breakfast)        Last Filled:  4/9/24 #30 2 refills    Patient Phone Number: [phone] (home)     Last appt: 11/30/2023   Next appt: Visit date not found

## 2024-10-16 RX ORDER — OMEPRAZOLE 40 MG/1
40 CAPSULE, DELAYED RELEASE ORAL
Qty: 90 CAPSULE | OUTPATIENT
Start: 2024-10-16

## 2024-10-16 NOTE — TELEPHONE ENCOUNTER
Medication:   Requested Prescriptions     Pending Prescriptions Disp Refills    omeprazole (PRILOSEC) 40 MG delayed release capsule [Pharmacy Med Name: OMEPRAZOLE DR 40 MG CAPSULE] 90 capsule      Sig: TAKE ONE CAPSULE BY MOUTH EVERY MORNING BEFORE BREAKFAST        Last Filled: 7/17/2024 #30 2 refills     Patient Phone Number: [phone] (home)     Last appt: 11/30/2023   Next appt: Visit date not found    [employer] message sent to sched annual phys after 11/30/24

## 2024-11-13 RX ORDER — OMEPRAZOLE 40 MG/1
40 CAPSULE, DELAYED RELEASE ORAL
Qty: 30 CAPSULE | Refills: 2 | OUTPATIENT
Start: 2024-11-13

## 2024-11-18 RX ORDER — OMEPRAZOLE 40 MG/1
40 CAPSULE, DELAYED RELEASE ORAL
Qty: 90 CAPSULE | OUTPATIENT
Start: 2024-11-18

## 2024-11-19 ENCOUNTER — PATIENT MESSAGE (OUTPATIENT)
Dept: PRIMARY CARE CLINIC | Age: 69
End: 2024-11-19

## 2024-11-19 ENCOUNTER — TELEPHONE (OUTPATIENT)
Dept: PRIMARY CARE CLINIC | Age: 69
End: 2024-11-19

## 2024-11-19 RX ORDER — OMEPRAZOLE 40 MG/1
40 CAPSULE, DELAYED RELEASE ORAL
Qty: 90 CAPSULE | OUTPATIENT
Start: 2024-11-19

## 2024-11-20 RX ORDER — OMEPRAZOLE 40 MG/1
40 CAPSULE, DELAYED RELEASE ORAL
Qty: 30 CAPSULE | Refills: 2 | Status: SHIPPED | OUTPATIENT
Start: 2024-11-20

## 2024-12-09 ENCOUNTER — OFFICE VISIT (OUTPATIENT)
Dept: PRIMARY CARE CLINIC | Age: 69
End: 2024-12-09

## 2024-12-09 VITALS
SYSTOLIC BLOOD PRESSURE: 180 MMHG | OXYGEN SATURATION: 99 % | WEIGHT: 200.7 LBS | BODY MASS INDEX: 33.44 KG/M2 | RESPIRATION RATE: 16 BRPM | HEART RATE: 75 BPM | TEMPERATURE: 98.2 F | DIASTOLIC BLOOD PRESSURE: 101 MMHG | HEIGHT: 65 IN

## 2024-12-09 DIAGNOSIS — Z78.9 VEGETARIAN DIET: ICD-10-CM

## 2024-12-09 DIAGNOSIS — Z00.00 ENCOUNTER FOR WELL ADULT EXAM WITHOUT ABNORMAL FINDINGS: Primary | ICD-10-CM

## 2024-12-09 DIAGNOSIS — R03.0 ELEVATED BLOOD PRESSURE READING: ICD-10-CM

## 2024-12-09 DIAGNOSIS — N39.43 BENIGN PROSTATIC HYPERPLASIA WITH POST-VOID DRIBBLING: ICD-10-CM

## 2024-12-09 DIAGNOSIS — R79.89 ABNORMAL THYROID SCREEN (BLOOD): ICD-10-CM

## 2024-12-09 DIAGNOSIS — N40.1 BENIGN PROSTATIC HYPERPLASIA WITH POST-VOID DRIBBLING: ICD-10-CM

## 2024-12-09 DIAGNOSIS — Z82.49 FAMILY HISTORY OF HEART ATTACK: ICD-10-CM

## 2024-12-09 LAB — HBA1C MFR BLD: 6 %

## 2024-12-09 SDOH — ECONOMIC STABILITY: FOOD INSECURITY: WITHIN THE PAST 12 MONTHS, THE FOOD YOU BOUGHT JUST DIDN'T LAST AND YOU DIDN'T HAVE MONEY TO GET MORE.: NEVER TRUE

## 2024-12-09 SDOH — ECONOMIC STABILITY: FOOD INSECURITY: WITHIN THE PAST 12 MONTHS, YOU WORRIED THAT YOUR FOOD WOULD RUN OUT BEFORE YOU GOT MONEY TO BUY MORE.: NEVER TRUE

## 2024-12-09 SDOH — ECONOMIC STABILITY: INCOME INSECURITY: HOW HARD IS IT FOR YOU TO PAY FOR THE VERY BASICS LIKE FOOD, HOUSING, MEDICAL CARE, AND HEATING?: NOT HARD AT ALL

## 2024-12-09 ASSESSMENT — ENCOUNTER SYMPTOMS
RHINORRHEA: 0
DIARRHEA: 0
SHORTNESS OF BREATH: 0
SORE THROAT: 0
CHEST TIGHTNESS: 0
ABDOMINAL PAIN: 0
CONSTIPATION: 0

## 2024-12-09 ASSESSMENT — PATIENT HEALTH QUESTIONNAIRE - PHQ9
1. LITTLE INTEREST OR PLEASURE IN DOING THINGS: NOT AT ALL
SUM OF ALL RESPONSES TO PHQ QUESTIONS 1-9: 0
SUM OF ALL RESPONSES TO PHQ9 QUESTIONS 1 & 2: 0
2. FEELING DOWN, DEPRESSED OR HOPELESS: NOT AT ALL

## 2024-12-09 NOTE — PATIENT INSTRUCTIONS
Well Visit, Over 65: Care Instructions  Well visits can help you stay healthy. Your doctor has checked your overall health and may have suggested ways to take good care of yourself. Your doctor also may have recommended tests. You can help prevent illness with healthy eating, good sleep, vaccinations, regular exercise, and other steps.    Get the tests that you and your doctor decide on. Depending on your age and risks, examples might include hearing tests as well as screening for colon, breast, and lung cancer. Screening helps find diseases before any symptoms appear.   Eat healthy foods. Choose fruits, vegetables, whole grains, lean protein, and low-fat dairy foods. Limit saturated fat, and reduce salt.     Limit alcohol. Men should have no more than 2 drinks a day. Women should have no more than 1. For some people, no alcohol is the best choice.   Exercise. It can help prevent falls. Get at least 30 minutes of exercise on most days of the week. Walking, yoga, and jersey chi can be good choices.     Reach and stay at your healthy weight. This will lower your risk for many health problems.   Take care of your mental health. Try to stay connected with friends, family, and community, and find ways to manage stress.     If you're feeling depressed or hopeless, talk to someone. A counselor can help. If you don't have a counselor, talk to your doctor.   Talk to your doctor if you think you may have a problem with alcohol or drug use. This includes prescription medicines and illegal drugs.     Avoid tobacco and nicotine: Don't smoke, vape, or chew. If you need help quitting, talk to your doctor.   Practice safer sex. Getting tested, using condoms or dental dams, and limiting sex partners can help prevent STIs.     Make an advance directive. This is a legal way to tell your family and doctor what you want to happen at the end of your life or when you can't speak for yourself.   Prevent problems where you can. Protect

## 2024-12-09 NOTE — PROGRESS NOTES
Well Adult Note  Name: Liliana Wright Today’s Date: 2024   MRN: [id number] Sex: Male   Age: 69 y.o. Ethnicity: Non- / Non    : 1955 Race: Unavailable      Liliana Wright is here for a well adult exam.       Subjective   History:  Chief Complaint   Patient presents with    Annual Exam   Urinary incontinence occasionally, no hematuria or pain.  Wants to discuss weight loss options- cut out carbohydrates but not exercising.    Review of Systems   Constitutional:  Negative for activity change, appetite change, fatigue and fever.   HENT:  Negative for congestion, rhinorrhea and sore throat.    Respiratory:  Negative for chest tightness and shortness of breath.    Cardiovascular:  Negative for chest pain, palpitations and leg swelling.   Gastrointestinal:  Negative for abdominal pain, constipation and diarrhea.   Genitourinary:  Negative for dysuria and frequency.   Musculoskeletal:  Negative for arthralgias.   Neurological:  Negative for dizziness, weakness and headaches.   Psychiatric/Behavioral:  Negative for hallucinations.    All other systems reviewed and are negative.    Allergies   Allergen Reactions    Other Swelling     Ppd-hair dye causes rash and swelling all over body     Bactrim [Sulfamethoxazole-Trimethoprim] Rash    Penicillins Rash    Sulfa Antibiotics Rash     Prior to Visit Medications    Medication Sig Taking? Authorizing Provider   omeprazole (PRILOSEC) 40 MG delayed release capsule Take 1 capsule by mouth every morning (before breakfast) Yes Deisi Yadav MD   erythromycin with ethanol (EMGEL) 2 % gel APPLY  TOPICALLY TWO TIMES A DAY Yes Deisi Yadav MD   Ergocalciferol 50 MCG ( UT) CAPS Take by mouth Yes ProviderKristen MD     Past Medical History:   Diagnosis Date    BMI 32.0-32.9,adult      Past Surgical History:   Procedure Laterality Date    ARM SURGERY Left     Fell while playing [place] plate  2013     Family History   Problem Relation Age of

## 2024-12-12 DIAGNOSIS — R03.0 ELEVATED BLOOD PRESSURE READING: ICD-10-CM

## 2024-12-12 DIAGNOSIS — N40.1 BENIGN PROSTATIC HYPERPLASIA WITH POST-VOID DRIBBLING: ICD-10-CM

## 2024-12-12 DIAGNOSIS — Z00.00 ENCOUNTER FOR WELL ADULT EXAM WITHOUT ABNORMAL FINDINGS: ICD-10-CM

## 2024-12-12 DIAGNOSIS — Z78.9 VEGETARIAN DIET: ICD-10-CM

## 2024-12-12 DIAGNOSIS — R79.89 ABNORMAL THYROID SCREEN (BLOOD): ICD-10-CM

## 2024-12-12 DIAGNOSIS — N39.43 BENIGN PROSTATIC HYPERPLASIA WITH POST-VOID DRIBBLING: ICD-10-CM

## 2024-12-12 LAB
ALBUMIN SERPL-MCNC: 4.4 G/DL (ref 3.4–5)
ALBUMIN/GLOB SERPL: 2.6 {RATIO} (ref 1.1–2.2)
ALP SERPL-CCNC: 76 U/L (ref 40–129)
ALT SERPL-CCNC: 21 U/L (ref 10–40)
ANION GAP SERPL CALCULATED.3IONS-SCNC: 11 MMOL/L (ref 3–16)
AST SERPL-CCNC: 22 U/L (ref 15–37)
BASOPHILS # BLD: 0.1 K/UL (ref 0–0.2)
BASOPHILS NFR BLD: 1.2 %
BILIRUB SERPL-MCNC: 0.3 MG/DL (ref 0–1)
BUN SERPL-MCNC: 11 MG/DL (ref 7–20)
CALCIUM SERPL-MCNC: 9.4 MG/DL (ref 8.3–10.6)
CHLORIDE SERPL-SCNC: 105 MMOL/L (ref 99–110)
CHOLEST SERPL-MCNC: 196 MG/DL (ref 0–199)
CO2 SERPL-SCNC: 27 MMOL/L (ref 21–32)
CREAT SERPL-MCNC: 0.9 MG/DL (ref 0.8–1.3)
DEPRECATED RDW RBC AUTO: 14.5 % (ref 12.4–15.4)
EOSINOPHIL # BLD: 0.2 K/UL (ref 0–0.6)
EOSINOPHIL NFR BLD: 2.3 %
EST. AVERAGE GLUCOSE BLD GHB EST-MCNC: 119.8 MG/DL
FOLATE SERPL-MCNC: 10.3 NG/ML (ref 4.78–24.2)
GFR SERPLBLD CREATININE-BSD FMLA CKD-EPI: >90 ML/MIN/{1.73_M2}
GLUCOSE SERPL-MCNC: 102 MG/DL (ref 70–99)
HBA1C MFR BLD: 5.8 %
HCT VFR BLD AUTO: 43.4 % (ref 40.5–52.5)
HDLC SERPL-MCNC: 48 MG/DL (ref 40–60)
HGB BLD-MCNC: 14.4 G/DL (ref 13.5–17.5)
LDLC SERPL CALC-MCNC: 132 MG/DL
LYMPHOCYTES # BLD: 1.1 K/UL (ref 1–5.1)
LYMPHOCYTES NFR BLD: 16.6 %
MCH RBC QN AUTO: 28.8 PG (ref 26–34)
MCHC RBC AUTO-ENTMCNC: 33.1 G/DL (ref 31–36)
MCV RBC AUTO: 87 FL (ref 80–100)
MONOCYTES # BLD: 0.8 K/UL (ref 0–1.3)
MONOCYTES NFR BLD: 12.2 %
NEUTROPHILS # BLD: 4.6 K/UL (ref 1.7–7.7)
NEUTROPHILS NFR BLD: 67.7 %
PLATELET # BLD AUTO: 302 K/UL (ref 135–450)
PMV BLD AUTO: 9.5 FL (ref 5–10.5)
POTASSIUM SERPL-SCNC: 4.4 MMOL/L (ref 3.5–5.1)
PROT SERPL-MCNC: 6.1 G/DL (ref 6.4–8.2)
PSA SERPL DL<=0.01 NG/ML-MCNC: 0.29 NG/ML (ref 0–4)
RBC # BLD AUTO: 4.99 M/UL (ref 4.2–5.9)
SODIUM SERPL-SCNC: 143 MMOL/L (ref 136–145)
T4 FREE SERPL-MCNC: 1.1 NG/DL (ref 0.9–1.8)
TRIGL SERPL-MCNC: 78 MG/DL (ref 0–150)
TSH SERPL DL<=0.005 MIU/L-ACNC: 5.13 UIU/ML (ref 0.27–4.2)
VIT B12 SERPL-MCNC: <150 PG/ML (ref 211–911)
VLDLC SERPL CALC-MCNC: 16 MG/DL
WBC # BLD AUTO: 6.8 K/UL (ref 4–11)

## 2024-12-17 ENCOUNTER — PATIENT MESSAGE (OUTPATIENT)
Dept: PRIMARY CARE CLINIC | Age: 69
End: 2024-12-17

## 2024-12-18 ENCOUNTER — TELEMEDICINE (OUTPATIENT)
Dept: PRIMARY CARE CLINIC | Age: 69
End: 2024-12-18
Payer: COMMERCIAL

## 2024-12-18 DIAGNOSIS — E03.9 ACQUIRED HYPOTHYROIDISM: Primary | ICD-10-CM

## 2024-12-18 DIAGNOSIS — E53.8 B12 DEFICIENCY: ICD-10-CM

## 2024-12-18 PROCEDURE — 1123F ACP DISCUSS/DSCN MKR DOCD: CPT | Performed by: FAMILY MEDICINE

## 2024-12-18 PROCEDURE — 99214 OFFICE O/P EST MOD 30 MIN: CPT | Performed by: FAMILY MEDICINE

## 2024-12-18 RX ORDER — LEVOTHYROXINE SODIUM 25 UG/1
12.5 TABLET ORAL DAILY
Qty: 45 TABLET | Refills: 0 | Status: SHIPPED | OUTPATIENT
Start: 2024-12-18 | End: 2025-03-18

## 2024-12-18 ASSESSMENT — ENCOUNTER SYMPTOMS
CONSTIPATION: 0
DIARRHEA: 0
SORE THROAT: 0
SHORTNESS OF BREATH: 0
ABDOMINAL PAIN: 0
CHEST TIGHTNESS: 0
RHINORRHEA: 0

## 2024-12-18 NOTE — PROGRESS NOTES
Subjective:   Patient ID: Liliana Wright is a 69 y.o. male.  HPI by clinical support staff:   Chief Complaint   Patient presents with    Results     Blood work 12/12/2024      Preliminary data above this line collected by clinical support staff.    ______________________________________________________________________  HPI by Provider:   HPI   Patient presents today virtually to discuss recent lab results.  States that he has been experiencing some fatigue, uncontrolled weight gain and hair loss.  Has not increased physical activity since last visit.  Denies any constipation, skin changes or brain fog.  No other concerns at this time.   Data above this line collected by Provider.    Patient's medications, allergies, past medical, surgical, social and family histories were reviewed and updated as appropriate.  Patient Care Team:  Deisi Ydaav MD as PCP - General (Family Medicine)  De[facility] Yadav [facility] P[facility]  Current Outpatient Medications on File Prior to Visit   Medication Sig Dispense Refill    omeprazole (PRILOSEC) 40 MG delayed release capsule Take 1 capsule by mouth every morning (before breakfast) 30 capsule 2    erythromycin with ethanol (EMGEL) 2 % gel APPLY  TOPICALLY TWO TIMES A DAY 30 g 1    Ergocalciferol 50 MCG (2000 UT) CAPS Take by mouth       No current facility-administered medications on file prior to visit.     Review of Systems   Constitutional:  Negative for activity change, appetite change, fatigue and fever.   HENT:  Negative for congestion, rhinorrhea and sore throat.    Respiratory:  Negative for chest tightness and shortness of breath.    Cardiovascular:  Negative for chest pain, palpitations and leg swelling.   Gastrointestinal:  Negative for abdominal pain, constipation and diarrhea.   Genitourinary:  Negative for dysuria and frequency.   Musculoskeletal:  Negative for arthralgias.   Neurological:  Negative for dizziness, weakness and headaches.

## 2025-02-14 RX ORDER — OMEPRAZOLE 40 MG/1
40 CAPSULE, DELAYED RELEASE ORAL
Qty: 90 CAPSULE | Refills: 0 | Status: SHIPPED | OUTPATIENT
Start: 2025-02-14

## 2025-02-14 NOTE — TELEPHONE ENCOUNTER
Medication:   Requested Prescriptions     Pending Prescriptions Disp Refills    omeprazole (PRILOSEC) 40 MG delayed release capsule [Pharmacy Med Name: OMEPRAZOLE DR 40 MG CAPSULE] 90 capsule 0     Sig: TAKE ONE CAPSULE BY MOUTH EVERY MORNING BEFORE BREAKFAST        Last Filled:  11/20/2024 #30 2rf    Patient Phone Number: [phone] (home)     Last appt: 12/18/2024   Next appt: Visit date not found    Last OARRS:        No data to display

## 2025-03-15 DIAGNOSIS — E03.9 ACQUIRED HYPOTHYROIDISM: ICD-10-CM

## 2025-03-16 RX ORDER — LEVOTHYROXINE SODIUM 25 UG/1
TABLET ORAL
Qty: 45 TABLET | Refills: 0 | Status: SHIPPED | OUTPATIENT
Start: 2025-03-16

## 2025-03-20 DIAGNOSIS — L02.91 ABSCESS: ICD-10-CM

## 2025-03-20 RX ORDER — TRIAMCINOLONE ACETONIDE 5 MG/G
CREAM TOPICAL
Qty: 80 G | Refills: 0 | Status: SHIPPED | OUTPATIENT
Start: 2025-03-20

## 2025-03-20 NOTE — TELEPHONE ENCOUNTER
Medication:   Requested Prescriptions     Pending Prescriptions Disp Refills    triamcinolone (ARISTOCORT) 0.5 % cream [Pharmacy Med Name: TRIAMCINOLONE 0.5% CREAM] 45 g      Sig: APPLY TOPICALLY THREE TIMES A DAY        Last Filled:  11/30/23 1 refill    Patient Phone Number: [phone] (home)     Last appt: 12/18/2024   Next appt: Visit date not found    Last OARRS:        No data to display

## 2025-05-12 RX ORDER — OMEPRAZOLE 40 MG/1
40 CAPSULE, DELAYED RELEASE ORAL
Qty: 90 CAPSULE | Refills: 0 | Status: SHIPPED | OUTPATIENT
Start: 2025-05-12

## 2025-05-12 NOTE — TELEPHONE ENCOUNTER
Medication:   Requested Prescriptions     Pending Prescriptions Disp Refills    omeprazole (PRILOSEC) 40 MG delayed release capsule [Pharmacy Med Name: OMEPRAZOLE DR 40 MG CAPSULE] 90 capsule 0     Sig: TAKE 1 CAPSULE BY MOUTH EVERY MORNING BEFORE BREAKFAST        Last Filled:  2/14/25 #90 0 refills    Patient Phone Number: [phone] (home)     Last appt: 12/18/2024   Next appt: Visit date not found    Last OARRS:        No data to display

## 2025-07-13 DIAGNOSIS — L02.91 ABSCESS: ICD-10-CM

## 2025-07-14 ENCOUNTER — OFFICE VISIT (OUTPATIENT)
Dept: PRIMARY CARE CLINIC | Age: 70
End: 2025-07-14
Payer: COMMERCIAL

## 2025-07-14 VITALS
OXYGEN SATURATION: 98 % | BODY MASS INDEX: 32.65 KG/M2 | WEIGHT: 196 LBS | DIASTOLIC BLOOD PRESSURE: 87 MMHG | HEART RATE: 76 BPM | HEIGHT: 65 IN | SYSTOLIC BLOOD PRESSURE: 164 MMHG | TEMPERATURE: 97.8 F

## 2025-07-14 DIAGNOSIS — E03.9 ACQUIRED HYPOTHYROIDISM: ICD-10-CM

## 2025-07-14 DIAGNOSIS — E53.8 B12 DEFICIENCY: ICD-10-CM

## 2025-07-14 DIAGNOSIS — L74.0 HEAT RASH: Primary | ICD-10-CM

## 2025-07-14 DIAGNOSIS — R03.0 ELEVATED BLOOD PRESSURE READING: ICD-10-CM

## 2025-07-14 PROCEDURE — 99214 OFFICE O/P EST MOD 30 MIN: CPT | Performed by: FAMILY MEDICINE

## 2025-07-14 PROCEDURE — 1123F ACP DISCUSS/DSCN MKR DOCD: CPT | Performed by: FAMILY MEDICINE

## 2025-07-14 RX ORDER — TRIAMCINOLONE ACETONIDE 1 MG/G
OINTMENT TOPICAL 2 TIMES DAILY
Qty: 80 G | Refills: 0 | Status: SHIPPED | OUTPATIENT
Start: 2025-07-14 | End: 2025-07-21

## 2025-07-14 RX ORDER — TRIAMCINOLONE ACETONIDE 5 MG/G
CREAM TOPICAL
Qty: 45 G | Refills: 1 | Status: SHIPPED | OUTPATIENT
Start: 2025-07-14 | End: 2025-07-14

## 2025-07-14 SDOH — ECONOMIC STABILITY: FOOD INSECURITY: WITHIN THE PAST 12 MONTHS, YOU WORRIED THAT YOUR FOOD WOULD RUN OUT BEFORE YOU GOT MONEY TO BUY MORE.: NEVER TRUE

## 2025-07-14 SDOH — ECONOMIC STABILITY: FOOD INSECURITY: WITHIN THE PAST 12 MONTHS, THE FOOD YOU BOUGHT JUST DIDN'T LAST AND YOU DIDN'T HAVE MONEY TO GET MORE.: NEVER TRUE

## 2025-07-14 ASSESSMENT — PATIENT HEALTH QUESTIONNAIRE - PHQ9
SUM OF ALL RESPONSES TO PHQ QUESTIONS 1-9: 0
SUM OF ALL RESPONSES TO PHQ QUESTIONS 1-9: 0
1. LITTLE INTEREST OR PLEASURE IN DOING THINGS: NOT AT ALL
SUM OF ALL RESPONSES TO PHQ QUESTIONS 1-9: 0
2. FEELING DOWN, DEPRESSED OR HOPELESS: NOT AT ALL
SUM OF ALL RESPONSES TO PHQ QUESTIONS 1-9: 0

## 2025-07-15 ENCOUNTER — TELEPHONE (OUTPATIENT)
Dept: PRIMARY CARE CLINIC | Age: 70
End: 2025-07-15

## 2025-07-15 DIAGNOSIS — E78.00 PURE HYPERCHOLESTEROLEMIA: Primary | ICD-10-CM

## 2025-07-15 ASSESSMENT — ENCOUNTER SYMPTOMS
CHEST TIGHTNESS: 0
SHORTNESS OF BREATH: 0
ABDOMINAL PAIN: 0
DIARRHEA: 0
SORE THROAT: 0
RHINORRHEA: 0
CONSTIPATION: 0

## 2025-07-15 NOTE — TELEPHONE ENCOUNTER
Pt would like the full panel of blood work to be placed in Epic including A1C and lipid panel    Please place order and call pt once complete

## 2025-07-15 NOTE — PROGRESS NOTES
Subjective:   Patient ID: Liliana Wrihgt is a 69 y.o. male.  HPI by clinical support staff:   Chief Complaint   Patient presents with    Rash     Patient present for rash on both arms, itchy at night      Preliminary data above this line collected by clinical support staff.    ______________________________________________________________________  HPI by Provider:   HPI   Patient presents today with complaint of  a rash on his arms - ongoing x 2 weeks- was outside and also sustained bug bites on his left arm. Tried triamcinolone 0.5% cream a few times and stopped. Also tried topical erythromycin but not consistent.  Also has a rash in his armpits- uses powder to reduce sweating but not sure if he can use erythromycin. Will like to get is labs drawn soon.   Data above this line collected by Provider.    Patient's medications, allergies, past medical, surgical, social and family histories were reviewed and updated as appropriate.  Patient Care Team:  Deisi Yadav MD as PCP - General (Family Medicine)  Deisi Yadav MD as PCP - Empaneled Provider  Current Outpatient Medications on File Prior to Visit   Medication Sig Dispense Refill    omeprazole (PRILOSEC) 40 MG delayed release capsule TAKE 1 CAPSULE BY MOUTH EVERY MORNING BEFORE BREAKFAST 90 capsule 0    cyanocobalamin ([facility] VITAMIN B12) 1000 MCG tablet Take 1 tablet by mouth daily 30 tablet 3    erythromycin with ethanol (EMGEL) 2 % gel APPLY  TOPICALLY TWO TIMES A DAY 30 g 1    Ergocalciferol 50 MCG (2000 UT) CAPS Take by mouth       No current facility-administered medications on file prior to visit.     Review of Systems   Constitutional:  Negative for activity change, appetite change, fatigue and fever.   HENT:  Negative for congestion, rhinorrhea and sore throat.    Respiratory:  Negative for chest tightness and shortness of breath.    Cardiovascular:  Negative for chest pain, palpitations and leg swelling.   Gastrointestinal:  Negative for abdominal

## 2025-07-17 DIAGNOSIS — E53.8 B12 DEFICIENCY: ICD-10-CM

## 2025-07-17 DIAGNOSIS — R03.0 ELEVATED BLOOD PRESSURE READING: ICD-10-CM

## 2025-07-17 DIAGNOSIS — E03.9 ACQUIRED HYPOTHYROIDISM: ICD-10-CM

## 2025-07-17 DIAGNOSIS — E78.00 PURE HYPERCHOLESTEROLEMIA: ICD-10-CM

## 2025-07-17 LAB
ALBUMIN SERPL-MCNC: 4.6 G/DL (ref 3.4–5)
ALBUMIN/GLOB SERPL: 2.4 {RATIO} (ref 1.1–2.2)
ALP SERPL-CCNC: 78 U/L (ref 40–129)
ALT SERPL-CCNC: 24 U/L (ref 10–40)
ANION GAP SERPL CALCULATED.3IONS-SCNC: 11 MMOL/L (ref 3–16)
AST SERPL-CCNC: 23 U/L (ref 15–37)
BASOPHILS # BLD: 0.1 K/UL (ref 0–0.2)
BASOPHILS NFR BLD: 0.9 %
BILIRUB SERPL-MCNC: 0.5 MG/DL (ref 0–1)
BUN SERPL-MCNC: 9 MG/DL (ref 7–20)
CALCIUM SERPL-MCNC: 9.4 MG/DL (ref 8.3–10.6)
CHLORIDE SERPL-SCNC: 105 MMOL/L (ref 99–110)
CHOLEST SERPL-MCNC: 200 MG/DL (ref 0–199)
CO2 SERPL-SCNC: 27 MMOL/L (ref 21–32)
CREAT SERPL-MCNC: 0.9 MG/DL (ref 0.8–1.3)
DEPRECATED RDW RBC AUTO: 14.6 % (ref 12.4–15.4)
EOSINOPHIL # BLD: 0.3 K/UL (ref 0–0.6)
EOSINOPHIL NFR BLD: 4.3 %
FOLATE SERPL-MCNC: 12.4 NG/ML (ref 4.78–24.2)
GFR SERPLBLD CREATININE-BSD FMLA CKD-EPI: >90 ML/MIN/{1.73_M2}
GLUCOSE SERPL-MCNC: 98 MG/DL (ref 70–99)
HCT VFR BLD AUTO: 43.7 % (ref 40.5–52.5)
HDLC SERPL-MCNC: 42 MG/DL (ref 40–60)
HGB BLD-MCNC: 14.7 G/DL (ref 13.5–17.5)
LDLC SERPL CALC-MCNC: 137 MG/DL
LYMPHOCYTES # BLD: 1.3 K/UL (ref 1–5.1)
LYMPHOCYTES NFR BLD: 21.4 %
MCH RBC QN AUTO: 28 PG (ref 26–34)
MCHC RBC AUTO-ENTMCNC: 33.7 G/DL (ref 31–36)
MCV RBC AUTO: 83.2 FL (ref 80–100)
MONOCYTES # BLD: 0.8 K/UL (ref 0–1.3)
MONOCYTES NFR BLD: 12.6 %
NEUTROPHILS # BLD: 3.7 K/UL (ref 1.7–7.7)
NEUTROPHILS NFR BLD: 60.8 %
PLATELET # BLD AUTO: 245 K/UL (ref 135–450)
PMV BLD AUTO: 9.2 FL (ref 5–10.5)
POTASSIUM SERPL-SCNC: 4.7 MMOL/L (ref 3.5–5.1)
PROT SERPL-MCNC: 6.5 G/DL (ref 6.4–8.2)
RBC # BLD AUTO: 5.25 M/UL (ref 4.2–5.9)
SODIUM SERPL-SCNC: 143 MMOL/L (ref 136–145)
T4 FREE SERPL-MCNC: 1.1 NG/DL (ref 0.9–1.8)
TRIGL SERPL-MCNC: 104 MG/DL (ref 0–150)
TSH SERPL DL<=0.005 MIU/L-ACNC: 5.79 UIU/ML (ref 0.27–4.2)
VIT B12 SERPL-MCNC: 240 PG/ML (ref 211–911)
VLDLC SERPL CALC-MCNC: 21 MG/DL
WBC # BLD AUTO: 6.1 K/UL (ref 4–11)

## 2025-07-18 LAB
EST. AVERAGE GLUCOSE BLD GHB EST-MCNC: 119.8 MG/DL
HBA1C MFR BLD: 5.8 %

## 2025-08-11 RX ORDER — OMEPRAZOLE 40 MG/1
40 CAPSULE, DELAYED RELEASE ORAL
Qty: 90 CAPSULE | Refills: 0 | Status: SHIPPED | OUTPATIENT
Start: 2025-08-11